# Patient Record
Sex: FEMALE | Race: WHITE | NOT HISPANIC OR LATINO | Employment: FULL TIME | URBAN - METROPOLITAN AREA
[De-identification: names, ages, dates, MRNs, and addresses within clinical notes are randomized per-mention and may not be internally consistent; named-entity substitution may affect disease eponyms.]

---

## 2017-09-14 ENCOUNTER — OFFICE VISIT (OUTPATIENT)
Dept: OCCUPATIONAL MEDICINE | Facility: CLINIC | Age: 37
End: 2017-09-14
Payer: OTHER MISCELLANEOUS

## 2017-09-14 VITALS
HEIGHT: 64 IN | BODY MASS INDEX: 23.73 KG/M2 | SYSTOLIC BLOOD PRESSURE: 109 MMHG | WEIGHT: 139 LBS | OXYGEN SATURATION: 98 % | DIASTOLIC BLOOD PRESSURE: 69 MMHG | HEART RATE: 67 BPM | RESPIRATION RATE: 18 BRPM | TEMPERATURE: 99 F
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
## 2017-09-14 DIAGNOSIS — S51.801A AVULSION OF SKIN OF RIGHT FOREARM, INITIAL ENCOUNTER: ICD-10-CM

## 2017-09-14 PROCEDURE — 99203 OFFICE O/P NEW LOW 30 MIN: CPT | Mod: S$GLB,,, | Performed by: EMERGENCY MEDICINE

## 2017-09-14 PROCEDURE — 3008F BODY MASS INDEX DOCD: CPT | Mod: S$GLB,,, | Performed by: EMERGENCY MEDICINE

## 2017-09-14 RX ORDER — AMOXICILLIN AND CLAVULANATE POTASSIUM 875; 125 MG/1; MG/1
1 TABLET, FILM COATED ORAL 2 TIMES DAILY
Qty: 20 TABLET | Refills: 0 | Status: SHIPPED | OUTPATIENT
Start: 2017-09-14 | End: 2017-09-24

## 2017-09-14 RX ORDER — MUPIROCIN 20 MG/G
OINTMENT TOPICAL
Qty: 22 G | Refills: 1 | Status: SHIPPED | OUTPATIENT
Start: 2017-09-14

## 2017-09-14 RX ORDER — FLUCONAZOLE 150 MG/1
150 TABLET ORAL
Qty: 2 TABLET | Refills: 0 | Status: SHIPPED | OUTPATIENT
Start: 2017-09-14 | End: 2017-09-24

## 2017-09-14 NOTE — PATIENT INSTRUCTIONS
CLEAN WOUND WITH ANTIBACTERIAL SOAP AND WATER LIKE DIAL  AUGMENTIN RX  BACTROBAN OINTMENT RX  MOTRIN 600 MG EVERY 6-8 HOURS FOR PAIN  DIFLUCAN RX IF DEVELOP YEAST INFECTION  SEE CAT BITE SHEET  FOLLOW UP WITH PCP AS NEEDED. NO NEED TO SEE OCC MED DOCTOR UNLESS HAVING LIMITATIONS AT WORK  SEE WORK STATUS REPORT  OK TO RETURN TO WORK TOMORROW 9/15/17  Cat Bite    A cat bite can cause a wound deep enough to break the skin. In such cases, the wound is cleaned and then sometimes closed. If the wound is closed it is usually not closed completely. This is so that fluid can drain if the wound becomes infected. Often the wound is left open to heal. In addition to wound care, a tetanus shot may be given, if needed.  Home care  · Wash your hands well with soap and warm water before and after caring for the wound. This helps lower the risk of infection.  · Care for the wound as directed. If a dressing was applied to the wound, be sure to change it as directed.  · If the wound bleeds, place a clean, soft cloth on the wound. Then firmly apply pressure until the bleeding stops. This may take up to 5 minutes. Do not release the pressure and look at the wound during this time.  · Always get medical attention for cat bites on the hand. They are highly likely to become infected.  · Most wounds heal within 10 days. But an infection can occur even with proper treatment. So be sure to check the wound daily for signs of infection (see below).  · Antibiotics may be prescribed. These help prevent or treat infection. If youre given antibiotics, take them as directed. Also be sure to complete the medicines.  Rabies prevention  Rabies is a virus that can be carried in certain animals. These can include domestic animals such as cats and dogs. Pets fully vaccinated against rabies (2 shots) are at very low risk of infection. But because human rabies is almost always fatal, any biting pet should be confined for 10 days as an extra precaution. In  general, if there is a risk for rabies, the following steps may need to be taken:  · If someones pet cat has bitten you, it should be kept in a secure area for the next 10 days to watch for signs of illness. (If the pet owner wont allow this, contact your local animal control center.) If the cat becomes ill or dies during that time, contact your local animal control center at once so the animal may be tested for rabies. If the cat stays healthy for the next 10 days, there is no danger of rabies in the animal or you.  · If a stray cat bit you, contact your local animal control center. They can give information on capture, quarantine, and animal rabies testing.  · If you cant find the animal that bit you in the next 2 days, and if rabies exists in your area, you may need to receive the rabies vaccine series. Call your healthcare provider right away. Or return to the emergency department promptly.  · All animal bites should be reported to the local animal control center. If you were not given a form to fill out, you can report this yourself.  Follow-up care  Follow up with your healthcare provider, or as directed.  When to seek medical advice  Call your healthcare provider right away if any of these occur:  · Signs of infection:  ¨ Spreading redness or warmth from the wound  ¨ Increased pain or swelling  ¨ Fever of 100.4ºF (38ºC) or higher, or as directed by your healthcare provider  ¨ Colored fluid or pus draining from the wound  ¨ Enlarged lymph nodes above the area that was bitten, such as lymph nodes in the armpit if you were bitten on the hand or arm. This may be a sign of cat-scratch disease (cat-scratch fever).  · Signs of rabies infection:  ¨ Headache  ¨ Confusion  ¨ Strange behavior  ¨ Increased salivating or drooling  ¨ Seizure  · Decreased ability to move any body part near the bite area  · Bleeding that can't be stopped after 5 minutes of firm pressure  Date Last Reviewed: 3/23/2015  © 2220-3367 The  [employer]. [address] [place] [address], [place] [address]. All rights reserved. This information is not intended as a substitute for professional medical care. Always follow your healthcare professional's instructions.

## 2017-09-14 NOTE — PROGRESS NOTES
"Subjective:       Patient ID: Saba Shin is a 37 y.o. female.    Vitals:  height is 5' 4" (1.626 m) and weight is 63 kg (139 lb). Her temperature is 98.8 °F (37.1 °C). Her blood pressure is 109/69 and her pulse is 67. Her respiration is 18 and oxygen saturation is 98%.     Chief Complaint: Animal Bite    PATIENT WORKS FOR [employer] AND WAS BIT BY CAT. SHE AND THE CAT ARE UP TO DATE ON IMMUNIZATIONS. SUSTAINED VERY SMALL SCRATCHES AND ALRGER RIGHT DORSAL FOREARM ABRASION/AVULSION OF THE RIGHT FOREARM. NO ACTIVE BLEEDING. SENT BY WORK AS WORKERS COMP CASE. OCCURRED TODAY. NO WEAKNESS, NO NUMBNESS,  NO TINGLING      Animal Bite    The incident occurred today. The incident occurred at work. There is an injury to the right forearm. The pain is moderate (localized ). Pertinent negatives include no chest pain, no abdominal pain, no nausea, no vomiting and no headaches. Her tetanus status is UTD.     Review of Systems   Constitution: Negative for chills and fever.   HENT: Negative for sore throat.    Eyes: Negative for blurred vision.   Cardiovascular: Negative for chest pain.   Respiratory: Negative for shortness of breath.    Skin: Negative for rash.        Rt forearm BITE, SCRATCH, SKIN AVULSION   Musculoskeletal: Negative for back pain and joint pain.   Gastrointestinal: Negative for abdominal pain, diarrhea, nausea and vomiting.   Neurological: Negative for headaches.   Psychiatric/Behavioral: The patient is not nervous/anxious.        Objective:      Physical Exam   Constitutional: She is oriented to person, place, and time. She appears well-developed and well-nourished.   HENT:   Head: Normocephalic and atraumatic.   Eyes: EOM are normal. Pupils are equal, round, and reactive to light.   Neck: Normal range of motion.   Cardiovascular: Normal rate, regular rhythm and normal heart sounds.    Pulmonary/Chest: Effort normal and breath sounds normal.   Abdominal: Soft.   Musculoskeletal: Normal range of motion. " "  Neurological: She is alert and oriented to person, place, and time.   Skin: No rash noted. No erythema. No pallor.   AFFECTED AREA AS BELOW:  SMALL RIGHT DORSAL 1 CM TRIANGULAR FLAP AVULSION/DEEP ABRASION. 2 TINY SBRASIONS/LIKELY SCRATCHES NOTED AS WELL. PINK "SEROTONIN TATTOO" NOTED AT WRIST CONFUSED INITIALLY FOR CELLULITIS STREAKES HOWEVER OBVIOUSLY CHRONIC   Nursing note and vitals reviewed.      Assessment:       1. Cat bite, initial encounter    2. Avulsion of skin of right forearm, initial encounter        Plan:         Cat bite, initial encounter    Avulsion of skin of right forearm, initial encounter    Other orders  -     mupirocin (BACTROBAN) 2 % ointment; Apply to affected area 3 times daily  Dispense: 22 g; Refill: 1  -     amoxicillin-clavulanate 875-125mg (AUGMENTIN) 875-125 mg per tablet; Take 1 tablet by mouth 2 (two) times daily.  Dispense: 20 tablet; Refill: 0  -     fluconazole (DIFLUCAN) 150 MG Tab; Take 1 tablet (150 mg total) by mouth Every 5 (five) days.  Dispense: 2 tablet; Refill: 0        CLEAN WOUND WITH ANTIBACTERIAL SOAP AND WATER LIKE DIAL  AUGMENTIN RX  BACTROBAN OINTMENT RX  MOTRIN 600 MG EVERY 6-8 HOURS FOR PAIN  DIFLUCAN RX IF DEVELOP YEAST INFECTION  SEE CAT BITE SHEET  FOLLOW UP WITH PCP AS NEEDED. NO NEED TO SEE [facility] MED DOCTOR UNLESS HAVING LIMITATIONS AT WORK  SEE WORK STATUS REPORT  OK TO RETURN TO WORK TOMORROW 9/15/17  "

## 2018-01-24 ENCOUNTER — OFFICE VISIT (OUTPATIENT)
Dept: ENDOCRINOLOGY | Facility: CLINIC | Age: 38
End: 2018-01-24
Payer: COMMERCIAL

## 2018-01-24 ENCOUNTER — PATIENT MESSAGE (OUTPATIENT)
Dept: ENDOCRINOLOGY | Facility: CLINIC | Age: 38
End: 2018-01-24

## 2018-01-24 VITALS
HEIGHT: 64 IN | WEIGHT: 149.25 LBS | HEART RATE: 72 BPM | DIASTOLIC BLOOD PRESSURE: 70 MMHG | SYSTOLIC BLOOD PRESSURE: 110 MMHG | BODY MASS INDEX: 25.48 KG/M2

## 2018-01-24 DIAGNOSIS — Z86.39 HISTORY OF THYROID DISEASE: Primary | ICD-10-CM

## 2018-01-24 PROCEDURE — 99999 PR PBB SHADOW E&M-EST. PATIENT-LVL III: CPT | Mod: PBBFAC,,, | Performed by: INTERNAL MEDICINE

## 2018-01-24 PROCEDURE — 99204 OFFICE O/P NEW MOD 45 MIN: CPT | Mod: S$GLB,,, | Performed by: INTERNAL MEDICINE

## 2018-01-24 NOTE — ASSESSMENT & PLAN NOTE
Discussed s/sx of hyper and hypothyroid.  Unclear history of thyroid disease given her report of LT4 and GUERIN treatment.     Not on biotin or LT4.  Will check TFTs and gonadal axis.    Symptoms reported could possibly be related to menopause/perimenopause/thyroidal disease.  Previous FSH/LH/estrogen levels concerning for perimenopause.    Will obtain US to evaluate nodules.

## 2018-01-24 NOTE — PROGRESS NOTES
Subjective:       Patient ID: Saba Shin is a 38 y.o. female    Chief Complaint:   Chief Complaint   Patient presents with    Consult       HPI   8 years ago, found to have a nodule.  Had subsequent GUERIN.    Was on levothroxine.    Became pregnant (2009) - required LT4 during pregnancy.  Was off medication one year later.    Pregnancy 2013 - no LT4, but had regular monitoring.  All AI processes improved.  After her son was born, she did well up until 10 months later.     Other concerns:  Abnormal menses - LMP 10/2017 (3 in 2017)  Hot/cold flashes  Hair loss  Constipation  Mental fog  Dry skin  5-7lbs weight gain in last month  Low temp (96.4F)  Palpitations    Notes that it is hard to breathe and swallow.  She has to move her neck to make it easier. Was diagnosed with acute reversible cervical lordosis.      No fractures, chest pain    Denies biotn use in past 7 days  OTC supplements - glucosamine, probiotic, collagen supplement    Depression: has high stress  Sleep: works nights, school during day, and two children.  Snoring:  Has been told that she snores    No FH of thyroid.  Last US was 8 years.      Review of Systems   Constitutional: Negative for fever/chills.   Eyes: Negative for visual disturbance.   Respiratory: Negative for shortness of breath.    Cardiovascular: Negative for chest pain.   Gastrointestinal: Negative for abdominal pain.   Genitourinary: Negative for urgency.   Musculoskeletal: Negative for arthralgias.   Skin: Negative for wound.   Neurological: Negative for headaches.   Hematological: Does not bruise/bleed easily.   Psychiatric/Behavioral: Negative for sleep disturbance.         Objective:      Physical Exam   Constitutional: She appears well-developed.   HENT:   Right Ear: External ear normal.   Left Ear: External ear normal.   Nose: Nose normal.   Hearing normal  Dentition good   Neck: No tracheal deviation present. No thyromegaly present.   Cardiovascular: Normal rate.     No murmur heard.  Pulmonary/Chest: Effort normal and breath sounds normal.   Abdominal: Soft. There is no tenderness. No hernia.   Musculoskeletal: She exhibits no edema.   Neurological: She has normal reflexes. No cranial nerve deficit.   Skin: No rash noted.   No nodules   Psychiatric: She has a normal mood and affect. Judgment normal.   Vitals reviewed.      Assessment:       1. History of thyroid disease      Plan:       History of thyroid disease  Discussed s/sx of hyper and hypothyroid.  Unclear history of thyroid disease given her report of LT4 and GUERIN treatment.     Not on biotin or LT4.  Will check TFTs and gonadal axis.    Symptoms reported could possibly be related to menopause/perimenopause/thyroidal disease.  Previous FSH/LH/estrogen levels concerning for perimenopause.    Will obtain US to evaluate nodules.     RTC in 1year    Keya Sharma MD  Endocrinology Fellow     This case has been reviewed with staff, Dr. John.

## 2018-01-30 ENCOUNTER — PATIENT MESSAGE (OUTPATIENT)
Dept: ENDOCRINOLOGY | Facility: CLINIC | Age: 38
End: 2018-01-30